# Patient Record
Sex: FEMALE | Race: WHITE | NOT HISPANIC OR LATINO | Employment: UNEMPLOYED | URBAN - METROPOLITAN AREA
[De-identification: names, ages, dates, MRNs, and addresses within clinical notes are randomized per-mention and may not be internally consistent; named-entity substitution may affect disease eponyms.]

---

## 2024-11-21 ENCOUNTER — HOSPITAL ENCOUNTER (EMERGENCY)
Facility: HOSPITAL | Age: 54
Discharge: HOME | End: 2024-11-21
Attending: INTERNAL MEDICINE
Payer: COMMERCIAL

## 2024-11-21 DIAGNOSIS — S61.412A LACERATION OF LEFT HAND WITHOUT FOREIGN BODY, INITIAL ENCOUNTER: Primary | ICD-10-CM

## 2024-11-21 PROCEDURE — 90471 IMMUNIZATION ADMIN: CPT | Mod: ER | Performed by: INTERNAL MEDICINE

## 2024-11-21 PROCEDURE — 25000003 PHARM REV CODE 250: Mod: ER | Performed by: INTERNAL MEDICINE

## 2024-11-21 PROCEDURE — 63600175 PHARM REV CODE 636 W HCPCS: Mod: ER | Performed by: INTERNAL MEDICINE

## 2024-11-21 PROCEDURE — 12002 RPR S/N/AX/GEN/TRNK2.6-7.5CM: CPT | Mod: ER

## 2024-11-21 PROCEDURE — 99284 EMERGENCY DEPT VISIT MOD MDM: CPT | Mod: 25,ER

## 2024-11-21 PROCEDURE — 90714 TD VACC NO PRESV 7 YRS+ IM: CPT | Mod: ER | Performed by: INTERNAL MEDICINE

## 2024-11-21 RX ORDER — FLUCONAZOLE 150 MG/1
150 TABLET ORAL DAILY
Qty: 1 TABLET | Refills: 0 | Status: SHIPPED | OUTPATIENT
Start: 2024-11-21 | End: 2024-11-22

## 2024-11-21 RX ORDER — CEPHALEXIN 500 MG/1
500 CAPSULE ORAL EVERY 12 HOURS
Qty: 20 CAPSULE | Refills: 0 | Status: SHIPPED | OUTPATIENT
Start: 2024-11-21 | End: 2024-12-01

## 2024-11-21 RX ORDER — IBUPROFEN 400 MG/1
800 TABLET ORAL
Status: COMPLETED | OUTPATIENT
Start: 2024-11-21 | End: 2024-11-21

## 2024-11-21 RX ORDER — CEPHALEXIN 500 MG/1
500 CAPSULE ORAL
Status: COMPLETED | OUTPATIENT
Start: 2024-11-21 | End: 2024-11-21

## 2024-11-21 RX ADMIN — IBUPROFEN 800 MG: 400 TABLET ORAL at 10:11

## 2024-11-21 RX ADMIN — CEPHALEXIN 500 MG: 500 CAPSULE ORAL at 10:11

## 2024-11-21 RX ADMIN — CLOSTRIDIUM TETANI TOXOID ANTIGEN (FORMALDEHYDE INACTIVATED) AND CORYNEBACTERIUM DIPHTHERIAE TOXOID ANTIGEN (FORMALDEHYDE INACTIVATED) 0.5 ML: 5; 2 INJECTION, SUSPENSION INTRAMUSCULAR at 10:11

## 2024-11-22 VITALS
DIASTOLIC BLOOD PRESSURE: 70 MMHG | WEIGHT: 202 LBS | OXYGEN SATURATION: 98 % | BODY MASS INDEX: 33.66 KG/M2 | HEART RATE: 67 BPM | RESPIRATION RATE: 18 BRPM | HEIGHT: 65 IN | SYSTOLIC BLOOD PRESSURE: 137 MMHG | TEMPERATURE: 98 F

## 2024-11-22 NOTE — ED PROVIDER NOTES
Encounter Date: 11/21/2024    SCRIBE #1 NOTE: I, Lani Wright, am scribing for, and in the presence of,  Luis Boo MD. I have scribed the following portions of the note - Other sections scribed: HPI,ROS,PE.       History     Chief Complaint   Patient presents with    Laceration     Pt has laceration on palm of L hand from kitchen knife while cutting an avocado     Macie Hester is a 54 y.o. female, with a PMHx of GERD and HLD, who presents to the ED with wound to left hand onset PTA. Patient reports that she was cutting an avocado when she accidentally cut her hand with a knife. She describes a burning sensation to her hand. Patient reports to be unsure of when her last tetanus vaccination was and denies having any known allergies. No other exacerbating or alleviating factors. Denies fever or other associated symptoms.      The history is provided by the patient. No  was used.     Review of patient's allergies indicates:   Allergen Reactions    Codeine Other (See Comments)     hallucinations    Biaxin [clarithromycin] Hives     Past Medical History:   Diagnosis Date    Allergy     Anxiety and depression     Depression     GERD (gastroesophageal reflux disease)     Hyperlipidemia     elevated in 2/2014 - Total 230 and     Kidney stone     Left thyroid nodule 02/11/2014    7-mm hypoechoic nodule in the mid-pole of the left lobe of the thyroid - no significant worrisome features     Past Surgical History:   Procedure Laterality Date    APPENDECTOMY  2002    BELT ABDOMINOPLASTY  2006         DILATION AND CURETTAGE OF UTERUS  07/12/2017    ENDOMETRIAL ABLATION  07/12/2017    ESOPHAGOGASTRODUODENOSCOPY (EGD) WITH DILATION  07/08/2020    Dr. Covington - + dilation performed; sliding small size hiatal hernia, Diffuse erythema of mucosa in antrum with no bleeding - biopsies done , normal mucosa of esophagus.  Biopsy results:  chronic gastritis, negative H. Pylori;    TOTAL ABDOMINAL  HYSTERECTOMY  2018    with posterior colporrhaphy    TUBAL LIGATION       Family History   Problem Relation Name Age of Onset    Heart disease Mother          CHF    COPD Mother      Heart failure Father          passed age 49    Heart disease Father  49         at age 49 of heart attack    No Known Problems Sister      No Known Problems Brother      Breast cancer Neg Hx      Colon cancer Neg Hx      Ovarian cancer Neg Hx       Social History     Tobacco Use    Smoking status: Never    Smokeless tobacco: Never   Substance Use Topics    Alcohol use: No    Drug use: No     Review of Systems   Constitutional:  Negative for fever.   HENT:  Negative for sore throat.    Respiratory:  Negative for shortness of breath.    Cardiovascular:  Negative for chest pain.   Gastrointestinal:  Negative for abdominal pain, diarrhea, nausea and vomiting.   Genitourinary:  Negative for dysuria.   Musculoskeletal:  Negative for back pain.   Skin:  Positive for wound. Negative for rash.   Neurological:  Negative for weakness and headaches.   Psychiatric/Behavioral:  Negative for behavioral problems.    All other systems reviewed and are negative.      Physical Exam     Initial Vitals [24]   BP Pulse Resp Temp SpO2   137/70 67 18 97.9 °F (36.6 °C) 98 %      MAP       --         Physical Exam    Nursing note and vitals reviewed.  Constitutional: She appears well-developed and well-nourished.   HENT:   Head: Normocephalic and atraumatic.   Eyes: Conjunctivae are normal.   Neck: Neck supple.   Normal range of motion.  Cardiovascular:  Normal rate, regular rhythm and normal heart sounds.     Exam reveals no gallop and no friction rub.       No murmur heard.  Pulmonary/Chest: Breath sounds normal. No respiratory distress. She has no wheezes. She has no rhonchi. She has no rales.   Abdominal: Abdomen is soft. There is no abdominal tenderness.   Musculoskeletal:         General: No edema. Normal range of motion.       Cervical back: Normal range of motion and neck supple.     Neurological: She is alert and oriented to person, place, and time. GCS score is 15. GCS eye subscore is 4. GCS verbal subscore is 5. GCS motor subscore is 6.   Skin: Skin is warm and dry. Laceration noted.   Superficial laceration to left garcia aspect that is approximately 3 cm in width. Mild oozing of blood.   Psychiatric: She has a normal mood and affect.         ED Course   Lac Repair    Date/Time: 11/21/2024 10:13 PM    Performed by: Luis Boo MD  Authorized by: Luis Boo MD    Consent:     Consent obtained:  Verbal and written    Consent given by:  Patient    Risks discussed:  Infection, pain, poor cosmetic result, need for additional repair, nerve damage and tendon damage  Universal protocol:     Procedure explained and questions answered to patient or proxy's satisfaction: yes      Relevant documents present and verified: yes      Required blood products, implants, devices, and special equipment available: yes      Patient identity confirmed:  Verbally with patient and arm band  Anesthesia:     Anesthesia method:  None  Laceration details:     Location:  Hand    Hand location:  L palm    Length (cm):  3  Pre-procedure details:     Preparation:  Patient was prepped and draped in usual sterile fashion  Exploration:     Limited defect created (wound extended): no      Hemostasis achieved with:  Direct pressure    Imaging outcome: foreign body not noted      Wound extent: no nerve damage noted and no tendon damage noted      Contaminated: no    Treatment:     Area cleansed with:  Chlorhexidine    Amount of cleaning:  Extensive  Skin repair:     Repair method:  Tissue adhesive  Approximation:     Approximation:  Close  Repair type:     Repair type:  Simple  Post-procedure details:     Dressing:  Sterile dressing    Procedure completion:  Tolerated well, no immediate complications    Labs Reviewed - No data to display       Imaging  Results    None          Medications   Td (Tenivac) IM vaccine (>/= 8 yo) (0.5 mLs Intramuscular Given 11/21/24 2249)   cephALEXin capsule 500 mg (500 mg Oral Given 11/21/24 2246)   ibuprofen tablet 800 mg (800 mg Oral Given 11/21/24 2246)     Medical Decision Making  54 y.o. female, with a PMHx of GERD and HLD, who presents to the ED with wound to left hand onset PTA. Patient reports that she was cutting an avocado when she accidentally cut her hand with a knife. She describes a burning sensation to her hand. Patient reports to be unsure of when her last tetanus vaccination was and denies having any known allergies. No other exacerbating or alleviating factors. Denies fever or other associated symptoms.    Course of ED stay:   Patient tolerated wound repair well and was given instructions for home care.  Keflex/tetanus were given in the ED as well as a prescription for Keflex.  Patient was advised to follow-up with her primary care physician within the next week for re-evaluation/return to the emergency department if condition worsens.    Risk  Prescription drug management.            Scribe Attestation:   Scribe #1: I performed the above scribed service and the documentation accurately describes the services I performed. I attest to the accuracy of the note.              This document was produced by a scribe under my direction and in my presence. I agree with the content of the note and have made any necessary edits.     Dr. Boo    11/22/2024 4:39 AM                    Clinical Impression:  Final diagnoses:  [S61.412A] Laceration of left hand without foreign body, initial encounter (Primary)          ED Disposition Condition    Discharge Stable          ED Prescriptions       Medication Sig Dispense Start Date End Date Auth. Provider    cephALEXin (KEFLEX) 500 MG capsule Take 1 capsule (500 mg total) by mouth every 12 (twelve) hours. for 10 days 20 capsule 11/21/2024 12/1/2024 Luis Boo MD     fluconazole (DIFLUCAN) 150 MG Tab (Expires today) Take 1 tablet (150 mg total) by mouth once daily. for 1 day 1 tablet 11/21/2024 11/22/2024 Luis Boo MD          Follow-up Information    None          Luis Boo MD  11/22/24 1264

## 2025-05-29 ENCOUNTER — OFFICE VISIT (OUTPATIENT)
Dept: FAMILY MEDICINE | Facility: CLINIC | Age: 55
End: 2025-05-29
Payer: COMMERCIAL

## 2025-05-29 VITALS
WEIGHT: 173.31 LBS | DIASTOLIC BLOOD PRESSURE: 68 MMHG | HEART RATE: 72 BPM | TEMPERATURE: 98 F | OXYGEN SATURATION: 96 % | SYSTOLIC BLOOD PRESSURE: 112 MMHG | BODY MASS INDEX: 28.87 KG/M2 | HEIGHT: 65 IN

## 2025-05-29 DIAGNOSIS — Z12.31 SCREENING MAMMOGRAM FOR BREAST CANCER: ICD-10-CM

## 2025-05-29 DIAGNOSIS — K21.9 GASTROESOPHAGEAL REFLUX DISEASE, UNSPECIFIED WHETHER ESOPHAGITIS PRESENT: ICD-10-CM

## 2025-05-29 DIAGNOSIS — R23.2 HOT FLASHES: ICD-10-CM

## 2025-05-29 DIAGNOSIS — Z00.00 ANNUAL PHYSICAL EXAM: Primary | ICD-10-CM

## 2025-05-29 DIAGNOSIS — Z98.890 STATUS POST DILATION OF ESOPHAGEAL NARROWING: ICD-10-CM

## 2025-05-29 DIAGNOSIS — I83.91 VARICOSE VEINS OF RIGHT LOWER EXTREMITY, UNSPECIFIED WHETHER COMPLICATED: ICD-10-CM

## 2025-05-29 DIAGNOSIS — Z87.19 STATUS POST DILATION OF ESOPHAGEAL NARROWING: ICD-10-CM

## 2025-05-29 DIAGNOSIS — R45.86 MOOD CHANGES: ICD-10-CM

## 2025-05-29 PROCEDURE — 99999 PR PBB SHADOW E&M-EST. PATIENT-LVL V: CPT | Mod: PBBFAC,,, | Performed by: FAMILY MEDICINE

## 2025-05-29 PROCEDURE — 99386 PREV VISIT NEW AGE 40-64: CPT | Mod: S$GLB,,, | Performed by: FAMILY MEDICINE

## 2025-05-29 NOTE — PROGRESS NOTES
Assessment & Plan:    Annual physical exam  -     CBC Auto Differential; Future; Expected date: 05/29/2025  -     Comprehensive Metabolic Panel; Future; Expected date: 05/29/2025  -     Hemoglobin A1C; Future; Expected date: 05/29/2025  -     Lipid Panel; Future; Expected date: 05/29/2025  -     HIV 1/2 Ag/Ab (4th Gen); Future; Expected date: 05/29/2025  -     Hepatitis C Antibody; Future; Expected date: 05/29/2025    Mood changes  -     Follicle Stimulating Hormone; Future; Expected date: 05/29/2025  -     Prolactin; Future; Expected date: 05/29/2025  -     Testosterone,Total; Future; Expected date: 05/29/2025  -     Estrogens, Total; Future; Expected date: 05/29/2025  -     TSH; Future; Expected date: 05/29/2025    Hot flashes  -     Follicle Stimulating Hormone; Future; Expected date: 05/29/2025  -     Prolactin; Future; Expected date: 05/29/2025  -     Testosterone,Total; Future; Expected date: 05/29/2025  -     Estrogens, Total; Future; Expected date: 05/29/2025  -     TSH; Future; Expected date: 05/29/2025    Patient is likely perimenopausal. We will schedule fasting labs and review them in a virtual visit in 1-2 weeks. She would like to discuss anxiety management at that time.    Varicose veins of right lower extremity, unspecified whether complicated  -     Ambulatory referral/consult to Vascular Surgery; Future; Expected date: 06/05/2025    Gastroesophageal reflux disease, unspecified whether esophagitis present  -     Ambulatory referral/consult to Gastroenterology; Future; Expected date: 06/05/2025    Status post dilation of esophageal narrowing  -     Ambulatory referral/consult to Gastroenterology; Future; Expected date: 06/05/2025    Screening mammogram for breast cancer  -     Mammo Digital Screening Bilat w/ Crow (XPD); Future; Expected date: 05/29/2025      Health maintenance reviewed & addressed above.  -Patient is due for colon CA screening. Referral to GI to discuss whether she needs a repeat  "EGD, during which a colonoscopy could be performed at the same time.    Follow-up: Follow up in about 2 weeks (around 6/12/2025) for lab review & anxiety management (virtual).  ______________________________________________________________________    Chief Complaint  Chief Complaint   Patient presents with    Establish Care       HPI  Macie Hester is a 55 y.o. female with medical diagnoses as listed in the medical history and problem list that presents to the office to establish care. Patient is requesting a full lab panel, including hormone studies. She is concerned about menopause causing her anxiety. She also has vaginal dryness but libido is intact. She was told by a gynecologist that she is perimenopausal about 3-4 years ago. She is s/p total hysterectomy due to heavy vaginal bleeding causing iron deficiency anemia. Patient has a history of GERD that has improved with weight loss. Taking tirzepatide. She has had to have esophageal dilation in the past. However, she has been experiencing a sensation of food stuck in her throat. Takes OTC PPIs. She has varicose veins of the right LE that she would like to have treated with injections. Lastly, she c/o chronic neck pain and tightness due to a "pinched nerve" diagnosed by a chiropractor in the past.    Health Maintenance         Date Due Completion Date    Hepatitis C Screening Never done ---    HIV Screening Never done ---    Hemoglobin A1c (Diabetic Prevention Screening) Never done ---    Colorectal Cancer Screening Never done ---    Mammogram 03/28/2020 3/28/2019    Shingles Vaccine (1 of 2) Never done ---    Pneumococcal Vaccines (Age 50+) (1 of 1 - PCV) Never done ---    Lipid Panel 02/02/2023 2/2/2018    COVID-19 Vaccine (1 - 2024-25 season) Never done ---    Influenza Vaccine (Season Ended) 09/01/2025 2/9/2018    TETANUS VACCINE 11/21/2034 11/21/2024    RSV Vaccine (Age 60+ and Pregnant patients) (1 - 1-dose 75+ series) 02/01/2046 ---              PAST " MEDICAL HISTORY:  Past Medical History:   Diagnosis Date    Allergy     Anxiety and depression     Depression     GERD (gastroesophageal reflux disease)     Hyperlipidemia     elevated in 2014 - Total 230 and     Kidney stone     Left thyroid nodule 2014    7-mm hypoechoic nodule in the mid-pole of the left lobe of the thyroid - no significant worrisome features       PAST SURGICAL HISTORY:  Past Surgical History:   Procedure Laterality Date    APPENDECTOMY      BELT ABDOMINOPLASTY           DILATION AND CURETTAGE OF UTERUS  2017    ENDOMETRIAL ABLATION  2017    ESOPHAGOGASTRODUODENOSCOPY (EGD) WITH DILATION  2020    Dr. Covington - + dilation performed; sliding small size hiatal hernia, Diffuse erythema of mucosa in antrum with no bleeding - biopsies done , normal mucosa of esophagus.  Biopsy results:  chronic gastritis, negative H. Pylori;    TOTAL ABDOMINAL HYSTERECTOMY  2018    with posterior colporrhaphy    TUBAL LIGATION         SOCIAL HISTORY:  Social History[1]    FAMILY HISTORY:  Family History   Problem Relation Name Age of Onset    Heart disease Mother          CHF    COPD Mother      Heart failure Father          passed age 49    Heart disease Father  49         at age 49 of heart attack    No Known Problems Sister      No Known Problems Brother      Breast cancer Neg Hx      Colon cancer Neg Hx      Ovarian cancer Neg Hx         ALLERGIES AND MEDICATIONS: updated and reviewed.  Review of patient's allergies indicates:   Allergen Reactions    Codeine Other (See Comments)     hallucinations    Biaxin [clarithromycin] Hives     Current Medications[2]      ROS  Review of Systems   Constitutional:  Negative for activity change.   Genitourinary:  Negative for menstrual problem.   Musculoskeletal:  Positive for neck pain and neck stiffness.   Psychiatric/Behavioral:  Positive for sleep disturbance.            Physical Exam  Vitals:    25 1436   BP:  "112/68   BP Location: Right arm   Patient Position: Sitting   Pulse: 72   Temp: 98.3 °F (36.8 °C)   TempSrc: Oral   SpO2: 96%   Weight: 78.6 kg (173 lb 4.5 oz)   Height: 5' 5" (1.651 m)    Body mass index is 28.84 kg/m².  Weight: 78.6 kg (173 lb 4.5 oz)   Height: 5' 5" (165.1 cm)   Physical Exam  Constitutional:       General: She is not in acute distress.     Appearance: Normal appearance.   HENT:      Head: Normocephalic and atraumatic.   Neck:      Thyroid: No thyromegaly.      Vascular: No carotid bruit.   Cardiovascular:      Rate and Rhythm: Normal rate and regular rhythm.      Pulses: Normal pulses.      Heart sounds: Normal heart sounds.      Comments: Varicose veins on the right ankle  Pulmonary:      Effort: Pulmonary effort is normal. No respiratory distress.      Breath sounds: Normal breath sounds.   Musculoskeletal:      Cervical back: Neck supple.      Right lower leg: No edema.      Left lower leg: No edema.   Lymphadenopathy:      Cervical: No cervical adenopathy.   Skin:     General: Skin is warm and dry.      Findings: No rash.   Neurological:      General: No focal deficit present.      Mental Status: She is alert and oriented to person, place, and time.   Psychiatric:         Mood and Affect: Mood normal.         Behavior: Behavior normal.         Thought Content: Thought content normal.                  [1]   Social History  Socioeconomic History    Marital status:    Occupational History    Occupation: clerical/office work   Tobacco Use    Smoking status: Never    Smokeless tobacco: Never   Substance and Sexual Activity    Alcohol use: No    Drug use: No    Sexual activity: Yes     Partners: Male     Birth control/protection: See Surgical Hx     Comment: [facility]     Social Drivers of Health     Financial Resource Strain: Patient Declined (5/22/2025)    Overall Financial Resource Strain (CARDIA)     Difficulty of Paying Living Expenses: Patient declined   Food Insecurity: Patient Declined " (5/22/2025)    Hunger Vital Sign     Worried About Running Out of Food in the Last Year: Patient declined     Ran Out of Food in the Last Year: Patient declined   Transportation Needs: Patient Declined (5/22/2025)    PRAPARE - Transportation     Lack of Transportation (Medical): Patient declined     Lack of Transportation (Non-Medical): Patient declined   Physical Activity: Insufficiently Active (5/22/2025)    Exercise Vital Sign     Days of Exercise per Week: 3 days     Minutes of Exercise per Session: 30 min   Stress: No Stress Concern Present (5/22/2025)    [employer] [place] [employer] - Occupational Stress Questionnaire     Feeling of Stress : Not at all   Housing Stability: Unknown (5/22/2025)    Housing Stability Vital Sign     Unable to Pay for Housing in the Last Year: Patient declined     Number of Times Moved in the Last Year: 0     Homeless in the Last Year: No   [2]   Current Outpatient Medications   Medication Sig Dispense Refill    triamterene-hydrochlorothiazide 37.5-25 mg (MAXZIDE-25) 37.5-25 mg per tablet Take 0.5 tablets by mouth daily as needed (fluid retention). 30 tablet 2     No current facility-administered medications for this visit.

## 2025-06-06 ENCOUNTER — HOSPITAL ENCOUNTER (OUTPATIENT)
Dept: RADIOLOGY | Facility: HOSPITAL | Age: 55
Discharge: HOME | End: 2025-06-06
Attending: FAMILY MEDICINE
Payer: COMMERCIAL

## 2025-06-06 DIAGNOSIS — Z12.31 SCREENING MAMMOGRAM FOR BREAST CANCER: ICD-10-CM

## 2025-06-06 PROCEDURE — 77063 BREAST TOMOSYNTHESIS BI: CPT | Mod: TC,PO

## 2025-06-06 PROCEDURE — 77063 BREAST TOMOSYNTHESIS BI: CPT | Mod: 26,,, | Performed by: RADIOLOGY

## 2025-06-06 PROCEDURE — 77067 SCR MAMMO BI INCL CAD: CPT | Mod: 26,,, | Performed by: RADIOLOGY

## 2025-06-12 ENCOUNTER — RESULTS FOLLOW-UP (OUTPATIENT)
Dept: FAMILY MEDICINE | Facility: CLINIC | Age: 55
End: 2025-06-12

## 2025-06-12 DIAGNOSIS — R45.86 MOOD CHANGES: Primary | ICD-10-CM

## 2025-06-12 DIAGNOSIS — R23.2 HOT FLASHES: ICD-10-CM

## 2025-06-12 DIAGNOSIS — Z00.00 ANNUAL PHYSICAL EXAM: ICD-10-CM

## 2025-06-12 NOTE — PROGRESS NOTES
Patient no-showed her lab appointment, so new labs had to be ordered. Virtual appointment that was scheduled today was rescheduled to a later date after labs are scheduled.

## 2025-06-13 ENCOUNTER — PATIENT OUTREACH (OUTPATIENT)
Dept: ADMINISTRATIVE | Facility: HOSPITAL | Age: 55
End: 2025-06-13
Payer: COMMERCIAL

## 2025-06-14 ENCOUNTER — LAB VISIT (OUTPATIENT)
Dept: LAB | Facility: HOSPITAL | Age: 55
End: 2025-06-14
Attending: FAMILY MEDICINE
Payer: COMMERCIAL

## 2025-06-14 DIAGNOSIS — Z00.00 ANNUAL PHYSICAL EXAM: ICD-10-CM

## 2025-06-14 DIAGNOSIS — R23.2 HOT FLASHES: ICD-10-CM

## 2025-06-14 DIAGNOSIS — R45.86 MOOD CHANGES: ICD-10-CM

## 2025-06-14 LAB
ABSOLUTE EOSINOPHIL (OHS): 0.16 K/UL
ABSOLUTE MONOCYTE (OHS): 0.33 K/UL (ref 0.3–1)
ABSOLUTE NEUTROPHIL COUNT (OHS): 2.54 K/UL (ref 1.8–7.7)
ALBUMIN SERPL BCP-MCNC: 4.3 G/DL (ref 3.5–5.2)
ALP SERPL-CCNC: 64 UNIT/L (ref 40–150)
ALT SERPL W/O P-5'-P-CCNC: 21 UNIT/L (ref 10–44)
ANION GAP (OHS): 9 MMOL/L (ref 8–16)
AST SERPL-CCNC: 25 UNIT/L (ref 11–45)
BASOPHILS # BLD AUTO: 0.04 K/UL
BASOPHILS NFR BLD AUTO: 0.8 %
BILIRUB SERPL-MCNC: 0.3 MG/DL (ref 0.1–1)
BUN SERPL-MCNC: 23 MG/DL (ref 6–20)
CALCIUM SERPL-MCNC: 9.2 MG/DL (ref 8.7–10.5)
CHLORIDE SERPL-SCNC: 108 MMOL/L (ref 95–110)
CHOLEST SERPL-MCNC: 178 MG/DL (ref 120–199)
CHOLEST/HDLC SERPL: 3.6 {RATIO} (ref 2–5)
CO2 SERPL-SCNC: 24 MMOL/L (ref 23–29)
CREAT SERPL-MCNC: 0.7 MG/DL (ref 0.5–1.4)
EAG (OHS): 103 MG/DL (ref 68–131)
ERYTHROCYTE [DISTWIDTH] IN BLOOD BY AUTOMATED COUNT: 11.7 % (ref 11.5–14.5)
GFR SERPLBLD CREATININE-BSD FMLA CKD-EPI: >60 ML/MIN/1.73/M2
GLUCOSE SERPL-MCNC: 82 MG/DL (ref 70–110)
HBA1C MFR BLD: 5.2 % (ref 4–5.6)
HCT VFR BLD AUTO: 37.1 % (ref 37–48.5)
HCV AB SERPL QL IA: NORMAL
HDLC SERPL-MCNC: 50 MG/DL (ref 40–75)
HDLC SERPL: 28.1 % (ref 20–50)
HGB BLD-MCNC: 12 GM/DL (ref 12–16)
HIV 1+2 AB+HIV1 P24 AG SERPL QL IA: NORMAL
IMM GRANULOCYTES # BLD AUTO: 0 K/UL (ref 0–0.04)
IMM GRANULOCYTES NFR BLD AUTO: 0 % (ref 0–0.5)
LDLC SERPL CALC-MCNC: 117.6 MG/DL (ref 63–159)
LYMPHOCYTES # BLD AUTO: 1.92 K/UL (ref 1–4.8)
MCH RBC QN AUTO: 31.7 PG (ref 27–31)
MCHC RBC AUTO-ENTMCNC: 32.3 G/DL (ref 32–36)
MCV RBC AUTO: 98 FL (ref 82–98)
NONHDLC SERPL-MCNC: 128 MG/DL
NUCLEATED RBC (/100WBC) (OHS): 0 /100 WBC
PLATELET # BLD AUTO: 209 K/UL (ref 150–450)
PMV BLD AUTO: 10.8 FL (ref 9.2–12.9)
POTASSIUM SERPL-SCNC: 4.1 MMOL/L (ref 3.5–5.1)
PROLACTIN SERPL IA-MCNC: 12.9 NG/ML (ref 5.2–26.5)
PROT SERPL-MCNC: 7.2 GM/DL (ref 6–8.4)
RBC # BLD AUTO: 3.79 M/UL (ref 4–5.4)
RELATIVE EOSINOPHIL (OHS): 3.2 %
RELATIVE LYMPHOCYTE (OHS): 38.5 % (ref 18–48)
RELATIVE MONOCYTE (OHS): 6.6 % (ref 4–15)
RELATIVE NEUTROPHIL (OHS): 50.9 % (ref 38–73)
SODIUM SERPL-SCNC: 141 MMOL/L (ref 136–145)
TESTOST SERPL-MCNC: 20 NG/DL (ref 5–73)
TRIGL SERPL-MCNC: 52 MG/DL (ref 30–150)
TSH SERPL-ACNC: 1.78 UIU/ML (ref 0.4–4)
WBC # BLD AUTO: 4.99 K/UL (ref 3.9–12.7)

## 2025-06-14 PROCEDURE — 86803 HEPATITIS C AB TEST: CPT

## 2025-06-14 PROCEDURE — 84155 ASSAY OF PROTEIN SERUM: CPT

## 2025-06-14 PROCEDURE — 82672 ASSAY OF ESTROGEN: CPT

## 2025-06-14 PROCEDURE — 84403 ASSAY OF TOTAL TESTOSTERONE: CPT

## 2025-06-14 PROCEDURE — 36415 COLL VENOUS BLD VENIPUNCTURE: CPT | Mod: PO

## 2025-06-14 PROCEDURE — 84146 ASSAY OF PROLACTIN: CPT

## 2025-06-14 PROCEDURE — 85025 COMPLETE CBC W/AUTO DIFF WBC: CPT

## 2025-06-14 PROCEDURE — 83036 HEMOGLOBIN GLYCOSYLATED A1C: CPT

## 2025-06-14 PROCEDURE — 84443 ASSAY THYROID STIM HORMONE: CPT

## 2025-06-14 PROCEDURE — 87389 HIV-1 AG W/HIV-1&-2 AB AG IA: CPT

## 2025-06-14 PROCEDURE — 80061 LIPID PANEL: CPT

## 2025-06-16 ENCOUNTER — RESULTS FOLLOW-UP (OUTPATIENT)
Dept: FAMILY MEDICINE | Facility: CLINIC | Age: 55
End: 2025-06-16

## 2025-06-20 LAB
ESTRADIOL: 3 PG/ML
ESTROGEN SERPL-MCNC: 19 PG/ML
ESTRONE PG/ML: 16 PG/ML

## 2025-06-23 ENCOUNTER — OFFICE VISIT (OUTPATIENT)
Dept: FAMILY MEDICINE | Facility: CLINIC | Age: 55
End: 2025-06-23
Payer: COMMERCIAL

## 2025-06-23 DIAGNOSIS — Z78.0 POSTMENOPAUSE: Primary | ICD-10-CM

## 2025-06-23 DIAGNOSIS — Z86.39 HISTORY OF OBESITY: ICD-10-CM

## 2025-06-23 PROCEDURE — 98005 SYNCH AUDIO-VIDEO EST LOW 20: CPT | Mod: 95,,, | Performed by: FAMILY MEDICINE

## 2025-06-23 RX ORDER — PAROXETINE 10 MG/1
10 TABLET, FILM COATED ORAL EVERY MORNING
Qty: 30 TABLET | Refills: 11 | Status: SHIPPED | OUTPATIENT
Start: 2025-06-23 | End: 2026-06-23

## 2025-06-23 NOTE — PROGRESS NOTES
Assessment & Plan:    Postmenopause  -     paroxetine (PAXIL) 10 MG tablet; Take 1 tablet (10 mg total) by mouth every morning.  Dispense: 30 tablet; Refill: 11    Labs reviewed, showing estrogen levels in post-menopausal range.   Recommended low dose Paxil for anxiety and hot flashes. Patient was informed that this medication may take 4-6 weeks to be fully effective but that side effects may occur sooner. Informed of the most common side effects & notify if they occur.   Encouraged patient to consider following up with her OB/GYN if she is interested in HRT.     History of obesity  Tirzepatide unlikely to be covered by her insurance. Rx for 7.5 mg to be faxed to [facility].     The patient location is:  Patient Home   The chief complaint leading to consultation is: noted below  Visit type: Virtual visit with synchronous audio and video  Total time spent with patient: 20 minutes  Each patient to whom he or she provides medical services by telemedicine is:  (1) informed of the relationship between the physician and patient and the respective role of any other health care provider with respect to management of the patient; and (2) notified that he or she may decline to receive medical services by telemedicine and may withdraw from such care at any time.    Follow-up: Follow up in about 6 weeks (around 8/4/2025) for anxiety, hot flashes.    ______________________________________________________________________    Chief Complaint  Chief Complaint   Patient presents with    Anxiety       HPI  Macie Hester is a 55 y.o. female with multiple medical diagnoses as listed in the medical history and problem list that presents in a virtual visit to follow up on her labs and discuss anxiety and hot flashes. She was last seen in the office on 5/29. She had hormone studies significant for low estrogen. She has hot flashes, worse at night. She has a known history of social anxiety, for which she was prescribed propranolol prn. She is  open to trying a new medication for menopausal symptoms. She would also like to transfer her management of tirzepatide to her PCP; currently taking 5 mg. Her weight has plateaued on this dose.     PAST MEDICAL HISTORY:  Past Medical History:   Diagnosis Date    Allergy     Anxiety and depression     Depression     GERD (gastroesophageal reflux disease)     Hyperlipidemia     elevated in 2014 - Total 230 and     Kidney stone     Left thyroid nodule 2014    7-mm hypoechoic nodule in the mid-pole of the left lobe of the thyroid - no significant worrisome features       PAST SURGICAL HISTORY:  Past Surgical History:   Procedure Laterality Date    APPENDECTOMY  2002    BELT ABDOMINOPLASTY           DILATION AND CURETTAGE OF UTERUS  2017    ENDOMETRIAL ABLATION  2017    ESOPHAGOGASTRODUODENOSCOPY (EGD) WITH DILATION  2020    Dr. Covington - + dilation performed; sliding small size hiatal hernia, Diffuse erythema of mucosa in antrum with no bleeding - biopsies done , normal mucosa of esophagus.  Biopsy results:  chronic gastritis, negative H. Pylori;    TOTAL ABDOMINAL HYSTERECTOMY  2018    with posterior colporrhaphy    TUBAL LIGATION         SOCIAL HISTORY:  Social History[1]    FAMILY HISTORY:  Family History   Problem Relation Name Age of Onset    Heart disease Mother          CHF    COPD Mother      Heart failure Father          passed age 49    Heart disease Father  49         at age 49 of heart attack    No Known Problems Sister      No Known Problems Brother      Breast cancer Neg Hx      Colon cancer Neg Hx      Ovarian cancer Neg Hx         ALLERGIES AND MEDICATIONS: updated and reviewed.  Review of patient's allergies indicates:   Allergen Reactions    Codeine Other (See Comments)     hallucinations    Biaxin [clarithromycin] Hives     Current Medications[2]      ROS  Review of Systems   Constitutional:  Positive for diaphoresis. Negative for activity change and  unexpected weight change.   HENT:  Negative for hearing loss, rhinorrhea and trouble swallowing.    Eyes:  Negative for discharge and visual disturbance.   Respiratory:  Negative for chest tightness and wheezing.    Cardiovascular:  Negative for chest pain and palpitations.   Gastrointestinal:  Negative for blood in stool, constipation, diarrhea and vomiting.   Endocrine: Negative for polydipsia and polyuria.   Genitourinary:  Negative for difficulty urinating, dysuria, hematuria and menstrual problem.   Musculoskeletal:  Negative for arthralgias, joint swelling and neck pain.   Neurological:  Negative for weakness and headaches.   Psychiatric/Behavioral:  Negative for confusion and dysphoric mood. The patient is nervous/anxious.            Physical Exam  Physical Exam  Constitutional:       General: She is not in acute distress.  HENT:      Head: Normocephalic and atraumatic.   Neurological:      Mental Status: She is alert. Mental status is at baseline.   Psychiatric:         Mood and Affect: Mood normal.         Behavior: Behavior normal.         *Physical exam limited by virtual visit.    Health Maintenance         Date Due Completion Date    Colorectal Cancer Screening Never done ---    Shingles Vaccine (1 of 2) Never done ---    Pneumococcal Vaccines (Age 50+) (1 of 1 - PCV) Never done ---    COVID-19 Vaccine (1 - 2024-25 season) Never done ---    Influenza Vaccine (Season Ended) 09/01/2025 2/9/2018    Mammogram 06/06/2026 6/6/2025    Hemoglobin A1c (Diabetic Prevention Screening) 06/14/2028 6/14/2025    Lipid Panel 06/14/2030 6/14/2025    TETANUS VACCINE 11/21/2034 11/21/2024    RSV Vaccine (Age 60+ and Pregnant patients) (1 - 1-dose 75+ series) 02/01/2046 ---                   [1]   Social History  Socioeconomic History    Marital status:    Occupational History    Occupation: clerical/office work   Tobacco Use    Smoking status: Never    Smokeless tobacco: Never   Substance and Sexual Activity     Alcohol use: No    Drug use: No    Sexual activity: Yes     Partners: Male     Birth control/protection: See Surgical Hx     Comment: Hyst     Social Drivers of Health     Financial Resource Strain: Patient Declined (5/22/2025)    Overall Financial Resource Strain (CARDIA)     Difficulty of Paying Living Expenses: Patient declined   Food Insecurity: Patient Declined (5/22/2025)    Hunger Vital Sign     Worried About Running Out of Food in the Last Year: Patient declined     Ran Out of Food in the Last Year: Patient declined   Transportation Needs: Patient Declined (5/22/2025)    PRAPARE - Transportation     Lack of Transportation (Medical): Patient declined     Lack of Transportation (Non-Medical): Patient declined   Physical Activity: Insufficiently Active (5/22/2025)    Exercise Vital Sign     Days of Exercise per Week: 3 days     Minutes of Exercise per Session: 30 min   Stress: No Stress Concern Present (5/22/2025)    [employer] - Occupational Stress Questionnaire     Feeling of Stress : Not at all   Housing Stability: Unknown (5/22/2025)    Housing Stability Vital Sign     Unable to Pay for Housing in the Last Year: Patient declined     Number of Times Moved in the Last Year: 0     Homeless in the Last Year: No   [2]   Current Outpatient Medications   Medication Sig Dispense Refill    tirzepatide 7.5 mg/0.5 mL PnIj Inject 7.5 mg into the skin every 7 days.      paroxetine (PAXIL) 10 MG tablet Take 1 tablet (10 mg total) by mouth every morning. 30 tablet 11    triamterene-hydrochlorothiazide 37.5-25 mg (MAXZIDE-25) 37.5-25 mg per tablet Take 0.5 tablets by mouth daily as needed (fluid retention). 30 tablet 2     No current facility-administered medications for this visit.

## 2025-06-26 ENCOUNTER — TELEPHONE (OUTPATIENT)
Dept: FAMILY MEDICINE | Facility: CLINIC | Age: 55
End: 2025-06-26
Payer: COMMERCIAL

## 2025-06-26 NOTE — TELEPHONE ENCOUNTER
Copied from CRM #[id number]. Topic: Medications - Medication Question  >> Jun 26, 2025 10:19 AM Dede wrote:  Type:  Pharmacy Calling to Clarify an RX    Name of Caller: [facility]     Pharmacy Name: [facility]     Prescription Name: tirzepatide 7.5 mg/0.5 mL PnIj    What do they need to clarify? Pharmacy needs pt's address and phone number faxed with the prescription     Can you be contacted via [facility]fax     Best Call Back Number: [phone]    Additional Information:

## 2025-06-30 ENCOUNTER — TELEPHONE (OUTPATIENT)
Dept: FAMILY MEDICINE | Facility: CLINIC | Age: 55
End: 2025-06-30
Payer: COMMERCIAL

## 2025-06-30 DIAGNOSIS — K21.9 GASTROESOPHAGEAL REFLUX DISEASE, UNSPECIFIED WHETHER ESOPHAGITIS PRESENT: ICD-10-CM

## 2025-06-30 DIAGNOSIS — Z12.11 SCREENING FOR COLON CANCER: Primary | ICD-10-CM

## 2025-06-30 DIAGNOSIS — Z87.19 STATUS POST DILATION OF ESOPHAGEAL NARROWING: ICD-10-CM

## 2025-06-30 DIAGNOSIS — Z98.890 STATUS POST DILATION OF ESOPHAGEAL NARROWING: ICD-10-CM

## 2025-06-30 NOTE — TELEPHONE ENCOUNTER
Copied from CRM #[id number]. Topic: General Inquiry - Patient Advice  >> Jun 30, 2025 12:39 PM Olga wrote:  Who called:[employer]      What is the request in detail:pt would like for you to give her a call in regards of scheduling an  colonoscopy appt the insurance would like a code they would like a call back [phone]      Can the clinic reply by [facility]?     Would the patient rather a call back or a response via [facility]?callback      Best call back number:.[phone]       Additional Information:

## 2025-07-01 ENCOUNTER — TELEPHONE (OUTPATIENT)
Dept: FAMILY MEDICINE | Facility: CLINIC | Age: 55
End: 2025-07-01
Payer: COMMERCIAL

## 2025-07-01 NOTE — TELEPHONE ENCOUNTER
Copied from CRM #[id number]. Topic: General Inquiry - Return Call  >> Jul 1, 2025  9:48 AM Dede wrote:  Type:  Patient Returning Call    Who Called: Self     Who Left Message for Patient: Nelia Michael CMA     Does the patient know what this is regarding?:unknown     Would the patient rather a call back or a response via [facility]? Call back     Best Call Back Number:[phone]    Additional Information:

## 2025-07-01 NOTE — TELEPHONE ENCOUNTER
Spoke with the patient she stated that she is requesting a referral for a colonoscopy appointment.

## 2025-07-03 ENCOUNTER — TELEPHONE (OUTPATIENT)
Dept: FAMILY MEDICINE | Facility: CLINIC | Age: 55
End: 2025-07-03
Payer: COMMERCIAL

## 2025-07-03 DIAGNOSIS — Z12.11 ENCOUNTER FOR SCREENING COLONOSCOPY: Primary | ICD-10-CM

## 2025-07-03 NOTE — TELEPHONE ENCOUNTER
Spoke with patient and she is requesting appointment for audio appointment for Colonoscopy. Appointment scheduled for 7/7/2025.

## 2025-07-03 NOTE — TELEPHONE ENCOUNTER
Copied from CRM #[id number]. Topic: General Inquiry - Patient Advice  >> Jul 1, 2025  3:06 PM Paty wrote:  Type:  Needs Medical Advice    Who Called: Patient    Best Call Back Number: [phone]    Additional Information: Patient is requesting a call back in regards to the procedure orders that were placed.

## 2025-07-07 ENCOUNTER — TELEPHONE (OUTPATIENT)
Dept: ENDOSCOPY | Facility: HOSPITAL | Age: 55
End: 2025-07-07

## 2025-07-07 ENCOUNTER — CLINICAL SUPPORT (OUTPATIENT)
Dept: ENDOSCOPY | Facility: HOSPITAL | Age: 55
End: 2025-07-07
Attending: FAMILY MEDICINE
Payer: COMMERCIAL

## 2025-07-07 VITALS — BODY MASS INDEX: 28.82 KG/M2 | WEIGHT: 173 LBS | HEIGHT: 65 IN

## 2025-07-07 DIAGNOSIS — Z12.11 ENCOUNTER FOR SCREENING COLONOSCOPY: ICD-10-CM

## 2025-07-07 DIAGNOSIS — Z12.11 SPECIAL SCREENING FOR MALIGNANT NEOPLASMS, COLON: Primary | ICD-10-CM

## 2025-07-07 NOTE — TELEPHONE ENCOUNTER
Patient is scheduled for a Colonoscopy on 8/7/25 with Dr. SONIA Olmos  Referral for procedure from PAT appointment

## 2025-07-24 ENCOUNTER — TELEPHONE (OUTPATIENT)
Dept: ENDOSCOPY | Facility: HOSPITAL | Age: 55
End: 2025-07-24
Payer: COMMERCIAL

## 2025-07-24 DIAGNOSIS — Z12.11 COLON CANCER SCREENING: Primary | ICD-10-CM

## 2025-07-24 NOTE — TELEPHONE ENCOUNTER
Copied from CRM #[id number]. Topic: General Inquiry - Patient Advice  >> Jul 24, 2025 12:04 PM Eva Isabell wrote:  Type: Patient Call Back    Who called:self     What is the request in detail:asking when will the prep be ordered for her colonoscopy on 08/07/2025     Can the clinic reply by [facility]?no     Would the patient rather a call back or a response via [facility]? Call     Best call back number:.[phone]      Additional Information:.  [id number] - [place] - [address] AT [facility] [address]  [address]  [address] [address]  Phone: [phone] Fax: [phone]  >> Jul 24, 2025  1:10 PM Med Assistant Hollie wrote:

## 2025-08-04 ENCOUNTER — ANESTHESIA EVENT (OUTPATIENT)
Dept: ENDOSCOPY | Facility: HOSPITAL | Age: 55
End: 2025-08-04
Payer: COMMERCIAL

## 2025-08-06 ENCOUNTER — TELEPHONE (OUTPATIENT)
Dept: FAMILY MEDICINE | Facility: CLINIC | Age: 55
End: 2025-08-06
Payer: COMMERCIAL

## 2025-08-06 NOTE — TELEPHONE ENCOUNTER
Copied from CRM #[id number]. Topic: General Inquiry - Patient Advice  >> Aug 6, 2025  2:47 PM Lita wrote:  .Type: Patient Call Back    Who called: Self     What is the request in detail: asked for a call back because she has a procedure tomorrow and needs to know what she can have to eat. She knows that she can have liquids but what specially what kind     Can the clinic reply by [facility]? Yes     Would the patient rather a call back or a response [facility]     Best call back number: .[phone]      Additional Information: Also asked to increase her medicine  Disp Refills Start End STAS  tirzepatide 7.5 mg/0.5 mL PnIj to 10 Ml

## 2025-08-06 NOTE — TELEPHONE ENCOUNTER
Answered questions regarding colonoscopy prep with patient . Understanding voiced. Patient is requesting an increase in tirzepatide to 10 mg . Patient is also asking if [facility] pharmacy is option for her to use if the price is comparable to [facility] pricing.

## 2025-08-07 ENCOUNTER — HOSPITAL ENCOUNTER (OUTPATIENT)
Facility: HOSPITAL | Age: 55
Discharge: HOME | End: 2025-08-07
Attending: INTERNAL MEDICINE | Admitting: INTERNAL MEDICINE
Payer: COMMERCIAL

## 2025-08-07 ENCOUNTER — ANESTHESIA (OUTPATIENT)
Dept: ENDOSCOPY | Facility: HOSPITAL | Age: 55
End: 2025-08-07
Payer: COMMERCIAL

## 2025-08-07 VITALS
TEMPERATURE: 98 F | DIASTOLIC BLOOD PRESSURE: 59 MMHG | HEART RATE: 62 BPM | BODY MASS INDEX: 29.99 KG/M2 | RESPIRATION RATE: 18 BRPM | HEIGHT: 65 IN | WEIGHT: 180 LBS | SYSTOLIC BLOOD PRESSURE: 119 MMHG | OXYGEN SATURATION: 98 %

## 2025-08-07 DIAGNOSIS — Z12.11 SCREEN FOR COLON CANCER: ICD-10-CM

## 2025-08-07 DIAGNOSIS — Z12.11 COLON CANCER SCREENING: Primary | ICD-10-CM

## 2025-08-07 PROCEDURE — 99900035 HC TECH TIME PER 15 MIN (STAT)

## 2025-08-07 PROCEDURE — 25000003 PHARM REV CODE 250: Performed by: INTERNAL MEDICINE

## 2025-08-07 PROCEDURE — 45385 COLONOSCOPY W/LESION REMOVAL: CPT | Mod: 33,,, | Performed by: INTERNAL MEDICINE

## 2025-08-07 PROCEDURE — 27201089 HC SNARE, DISP (ANY): Performed by: INTERNAL MEDICINE

## 2025-08-07 PROCEDURE — 94761 N-INVAS EAR/PLS OXIMETRY MLT: CPT

## 2025-08-07 PROCEDURE — 37000009 HC ANESTHESIA EA ADD 15 MINS: Performed by: INTERNAL MEDICINE

## 2025-08-07 PROCEDURE — 37000008 HC ANESTHESIA 1ST 15 MINUTES: Performed by: INTERNAL MEDICINE

## 2025-08-07 PROCEDURE — 45385 COLONOSCOPY W/LESION REMOVAL: CPT | Mod: 33 | Performed by: INTERNAL MEDICINE

## 2025-08-07 PROCEDURE — 63600175 PHARM REV CODE 636 W HCPCS: Performed by: REGISTERED NURSE

## 2025-08-07 RX ORDER — LIDOCAINE HYDROCHLORIDE 20 MG/ML
INJECTION INTRAVENOUS
Status: DISCONTINUED | OUTPATIENT
Start: 2025-08-07 | End: 2025-08-07

## 2025-08-07 RX ORDER — PROPOFOL 10 MG/ML
VIAL (ML) INTRAVENOUS CONTINUOUS PRN
Status: DISCONTINUED | OUTPATIENT
Start: 2025-08-07 | End: 2025-08-07

## 2025-08-07 RX ORDER — PROPOFOL 10 MG/ML
VIAL (ML) INTRAVENOUS
Status: DISCONTINUED | OUTPATIENT
Start: 2025-08-07 | End: 2025-08-07

## 2025-08-07 RX ORDER — SODIUM CHLORIDE 9 MG/ML
INJECTION, SOLUTION INTRAVENOUS CONTINUOUS
Status: DISCONTINUED | OUTPATIENT
Start: 2025-08-07 | End: 2025-08-07 | Stop reason: HOSPADM

## 2025-08-07 RX ADMIN — PROPOFOL 180 MCG/KG/MIN: 10 INJECTION, EMULSION INTRAVENOUS at 10:08

## 2025-08-07 RX ADMIN — LIDOCAINE HYDROCHLORIDE 80 MG: 20 INJECTION INTRAVENOUS at 10:08

## 2025-08-07 RX ADMIN — SODIUM CHLORIDE: 0.9 INJECTION, SOLUTION INTRAVENOUS at 10:08

## 2025-08-07 RX ADMIN — PROPOFOL 80 MG: 10 INJECTION, EMULSION INTRAVENOUS at 10:08
